# Patient Record
Sex: FEMALE | Race: WHITE | NOT HISPANIC OR LATINO | Employment: UNEMPLOYED | URBAN - METROPOLITAN AREA
[De-identification: names, ages, dates, MRNs, and addresses within clinical notes are randomized per-mention and may not be internally consistent; named-entity substitution may affect disease eponyms.]

---

## 2019-09-25 ENCOUNTER — HOSPITAL ENCOUNTER (EMERGENCY)
Facility: CLINIC | Age: 59
Discharge: HOME | End: 2019-09-25
Admitting: PHYSICIAN ASSISTANT
Payer: COMMERCIAL

## 2019-09-25 VITALS
WEIGHT: 140 LBS | RESPIRATION RATE: 16 BRPM | HEART RATE: 88 BPM | BODY MASS INDEX: 27.34 KG/M2 | OXYGEN SATURATION: 99 % | TEMPERATURE: 98.4 F | SYSTOLIC BLOOD PRESSURE: 198 MMHG | DIASTOLIC BLOOD PRESSURE: 108 MMHG

## 2019-09-25 DIAGNOSIS — R03.0 ELEVATED BLOOD PRESSURE READING WITHOUT DIAGNOSIS OF HYPERTENSION: ICD-10-CM

## 2019-09-25 DIAGNOSIS — N12 PYELONEPHRITIS: ICD-10-CM

## 2019-09-25 LAB
ALBUMIN UR-MCNC: 100 MG/DL
ANION GAP SERPL CALCULATED.3IONS-SCNC: 5 MMOL/L (ref 3–14)
APPEARANCE UR: ABNORMAL
BACTERIA #/AREA URNS HPF: ABNORMAL /HPF
BASOPHILS # BLD AUTO: 0 10E9/L (ref 0–0.2)
BASOPHILS NFR BLD AUTO: 0.2 %
BILIRUB UR QL STRIP: NEGATIVE
BUN SERPL-MCNC: 12 MG/DL (ref 7–30)
CALCIUM SERPL-MCNC: 9 MG/DL (ref 8.5–10.1)
CHLORIDE SERPL-SCNC: 107 MMOL/L (ref 94–109)
CO2 SERPL-SCNC: 27 MMOL/L (ref 20–32)
COLOR UR AUTO: ABNORMAL
CREAT SERPL-MCNC: 0.72 MG/DL (ref 0.52–1.04)
DIFFERENTIAL METHOD BLD: ABNORMAL
EOSINOPHIL # BLD AUTO: 0 10E9/L (ref 0–0.7)
EOSINOPHIL NFR BLD AUTO: 0.3 %
ERYTHROCYTE [DISTWIDTH] IN BLOOD BY AUTOMATED COUNT: 12.6 % (ref 10–15)
GFR SERPL CREATININE-BSD FRML MDRD: >90 ML/MIN/{1.73_M2}
GLUCOSE SERPL-MCNC: 107 MG/DL (ref 70–99)
GLUCOSE UR STRIP-MCNC: NEGATIVE MG/DL
HCT VFR BLD AUTO: 42.1 % (ref 35–47)
HGB BLD-MCNC: 14.4 G/DL (ref 11.7–15.7)
HGB UR QL STRIP: ABNORMAL
IMM GRANULOCYTES # BLD: 0.1 10E9/L (ref 0–0.4)
IMM GRANULOCYTES NFR BLD: 0.4 %
KETONES UR STRIP-MCNC: NEGATIVE MG/DL
LEUKOCYTE ESTERASE UR QL STRIP: ABNORMAL
LYMPHOCYTES # BLD AUTO: 1.6 10E9/L (ref 0.8–5.3)
LYMPHOCYTES NFR BLD AUTO: 13.2 %
MCH RBC QN AUTO: 30.1 PG (ref 26.5–33)
MCHC RBC AUTO-ENTMCNC: 34.2 G/DL (ref 31.5–36.5)
MCV RBC AUTO: 88 FL (ref 78–100)
MONOCYTES # BLD AUTO: 0.8 10E9/L (ref 0–1.3)
MONOCYTES NFR BLD AUTO: 6.4 %
MUCOUS THREADS #/AREA URNS LPF: PRESENT /LPF
NEUTROPHILS # BLD AUTO: 9.7 10E9/L (ref 1.6–8.3)
NEUTROPHILS NFR BLD AUTO: 79.5 %
NITRATE UR QL: NEGATIVE
NRBC # BLD AUTO: 0 10*3/UL
NRBC BLD AUTO-RTO: 0 /100
PH UR STRIP: 6.5 PH (ref 5–7)
PLATELET # BLD AUTO: 304 10E9/L (ref 150–450)
POTASSIUM SERPL-SCNC: 3.4 MMOL/L (ref 3.4–5.3)
RBC # BLD AUTO: 4.78 10E12/L (ref 3.8–5.2)
RBC #/AREA URNS AUTO: >182 /HPF (ref 0–2)
SODIUM SERPL-SCNC: 139 MMOL/L (ref 133–144)
SOURCE: ABNORMAL
SP GR UR STRIP: 1.01 (ref 1–1.03)
SQUAMOUS #/AREA URNS AUTO: 1 /HPF (ref 0–1)
UROBILINOGEN UR STRIP-MCNC: NORMAL MG/DL (ref 0–2)
WBC # BLD AUTO: 12.2 10E9/L (ref 4–11)
WBC #/AREA URNS AUTO: >182 /HPF (ref 0–5)
WBC CLUMPS #/AREA URNS HPF: PRESENT /HPF

## 2019-09-25 PROCEDURE — 25000128 H RX IP 250 OP 636: Performed by: PHYSICIAN ASSISTANT

## 2019-09-25 PROCEDURE — 85025 COMPLETE CBC W/AUTO DIFF WBC: CPT | Performed by: PHYSICIAN ASSISTANT

## 2019-09-25 PROCEDURE — 80048 BASIC METABOLIC PNL TOTAL CA: CPT | Performed by: PHYSICIAN ASSISTANT

## 2019-09-25 PROCEDURE — 81001 URINALYSIS AUTO W/SCOPE: CPT | Performed by: EMERGENCY MEDICINE

## 2019-09-25 PROCEDURE — 87086 URINE CULTURE/COLONY COUNT: CPT | Performed by: PHYSICIAN ASSISTANT

## 2019-09-25 PROCEDURE — 99284 EMERGENCY DEPT VISIT MOD MDM: CPT | Mod: 25

## 2019-09-25 PROCEDURE — 96365 THER/PROPH/DIAG IV INF INIT: CPT

## 2019-09-25 RX ORDER — CIPROFLOXACIN 500 MG/1
500 TABLET, FILM COATED ORAL 2 TIMES DAILY
Qty: 14 TABLET | Refills: 0 | Status: SHIPPED | OUTPATIENT
Start: 2019-09-25 | End: 2019-10-02

## 2019-09-25 RX ORDER — ONDANSETRON 4 MG/1
4 TABLET, ORALLY DISINTEGRATING ORAL EVERY 6 HOURS PRN
Qty: 20 TABLET | Refills: 0 | Status: SHIPPED | OUTPATIENT
Start: 2019-09-25 | End: 2019-10-13

## 2019-09-25 RX ORDER — CEFTRIAXONE 1 G/1
1 INJECTION, POWDER, FOR SOLUTION INTRAMUSCULAR; INTRAVENOUS ONCE
Status: COMPLETED | OUTPATIENT
Start: 2019-09-25 | End: 2019-09-25

## 2019-09-25 RX ADMIN — CEFTRIAXONE SODIUM 1 G: 1 INJECTION, POWDER, FOR SOLUTION INTRAMUSCULAR; INTRAVENOUS at 22:04

## 2019-09-25 ASSESSMENT — ENCOUNTER SYMPTOMS
VOMITING: 0
FEVER: 0
FREQUENCY: 1
BACK PAIN: 1
DYSURIA: 1

## 2019-09-25 NOTE — ED AVS SNAPSHOT
[facility]  [address]  [facility] [address]  Phone:  [phone]  Fax:  [phone]                                    Carmen Whiteside   MRN: [id number]    Department:  [facility] Emergency Department   Date of Visit:  9/25/2019           After Visit Summary Signature Page    I have received my discharge instructions, and my questions have been answered. I have discussed any challenges I see with this plan with the nurse or doctor.    ..........................................................................................................................................  Patient/Patient Representative Signature      ..........................................................................................................................................  Patient Representative Print Name and Relationship to Patient    ..................................................               ................................................  Date                                   Time    ..........................................................................................................................................  Reviewed by Signature/Title    ...................................................              ..............................................  Date                                               Time          22EPIC Rev 08/18

## 2019-09-26 NOTE — DISCHARGE INSTRUCTIONS
Discharge Instructions  Urinary Tract Infection  You or your child have been diagnosed with a urinary tract infection, or UTI. The urinary tract includes the kidneys (which make urine/pee), ureters (the tubes that carry urine/pee from the kidneys to the bladder), the bladder (which stores urine/pee), and urethra (the tube that carries urine/pee out of the bladder). Urinary tract infections occur when bacteria travel up the urethra into the bladder (bladder infection) and, in some cases, from there into the kidneys (kidney infection).  Generally, every Emergency Department visit should have a follow-up clinic visit with either a primary or a specialty clinic/provider. Please follow-up as instructed by your emergency provider today.  Return to the Emergency Department if:  You or your child have severe back pain.  You or your child are vomiting (throwing up) so that you cannot take your medicine.  You or your child have a new fever (had not previously had a fever) over 101 F.  You or your child have confusion or are very weak, or feel very ill.  Your child seems much more ill, will not wake up, will not respond right, or is crying for a long time and will not calm down.  You or your child are showing signs of dehydration. These signs may include decreased urination (pee), dry mouth/gums/tongue, or decreased activity.    Follow-up with your provider:   Children under 24 months need to be seen by their regular provider within one week after a diagnosis of a UTI. It may be necessary to do some more tests to look at the child s kidney or bladder.  You should begin to feel better within 24 - 48 hours of starting your antibiotic; follow-up with your regular clinic/doctor/provider if this is not the case.    Treatment:   You will be treated with an antibiotic to kill the bacteria. We have to make an educated guess, based on what we know about common bacteria and antibiotics, as to which antibiotic will work for your  "infection. We will be correct most times but there will be some cases where the antibiotic chosen is not correct (see urine cultures below).  Take a pain medication such as acetaminophen (Tylenol ) or ibuprofen (Advil , Motrin , Nuprin ).  Phenazopyridine (Pyridium , Uristat ) is a prescription medication that numbs the bladder to reduce the burning pain of some UTIs.  The same medication is available in a non-prescription version (Azo-Standard , Urodol ). This medication will change the color of the urine and tears (usually blue or orange). If you wear contacts, do not wear them while taking this medication as they may be stained by the medication.    Urine Cultures:  If indicated, a urine culture may have been performed today. This test generally takes 24-48 hours to complete so the results are not known at this time. The results can confirm that an infection is present but also determine which antibiotic is effective for the specific bacteria that is causing the infection. If your urine culture shows that the antibiotic you were given today will not work to treat your infection, we will attempt to contact you to make arrangements to change the antibiotic. If the culture confirms that the antibiotic is effective for your infection, you will not be contacted. We often recommend follow-up with your regular physician/provider on the culture results regardless of this process.    Antibiotic Warning:   If you have been placed on antibiotics - watch for signs of allergic reaction.  These include rash, lip swelling, difficulty breathing, wheezing, and dizziness.  If you develop any of these symptoms, stop the antibiotic immediately and go to an emergency room or urgent care for evaluation.    Probiotics: If you have been given an antibiotic, you may want to also take a probiotic pill or eat yogurt with live cultures. Probiotics have \"good bacteria\" to help your intestines stay healthy. Studies have shown that probiotics " help prevent diarrhea and other intestine problems (including C. diff infection) when you take antibiotics. You can buy these without a prescription in the pharmacy section of the store.   If you were given a prescription for medicine here today, be sure to read all of the information (including the package insert) that comes with your prescription.  This will include important information about the medicine, its side effects, and any warnings that you need to know about.  The pharmacist who fills the prescription can provide more information and answer questions you may have about the medicine.  If you have questions or concerns that the pharmacist cannot address, please call or return to the Emergency Department.   Remember that you can always come back to the Emergency Department if you are not able to see your regular provider in the amount of time listed above, if you get any new symptoms, or if there is anything that worries you.    Discharge Instructions  Hypertension - High Blood Pressure    During you visit to the Emergency Department, your blood pressure was higher than the recommended blood pressure.  This may be related to stress, pain, medication or other temporary conditions. In these cases, your blood pressure may return to normal on its own. If you have a history of high blood pressure, you may need to have your provider adjust your medications. Sometimes, your high measurement here may indicate that you have developed high blood pressure that will stay high unless it is treated. As a general rule, high blood pressure causes problems over years rather than days, weeks, or months. So, while it is important to treat blood pressure, it is rarely important to treat blood pressure immediately. Occasionally we will begin a medication in the Emergency Department; more often we will recommend close follow-up for medications with a primary doctor/clinic.    Generally, every Emergency Department visit should  have a follow-up clinic visit with either a primary or a specialty clinic/provider. Please follow-up as instructed by your emergency provider today.    Return to the Emergency Department if you start to have:  A severe headache.  Chest pain.  Shortness of breath.  Weakness or numbness that affects one part of the body.  Confusion.  Vision changes.  Significant swelling of legs and/or eyes.  A reaction to any medication started in the Emergency Department.    What can I do to help myself?  Avoid alcohol.  Take any blood pressure medicine that you are prescribed.  Get a good night s sleep.  Lower your salt intake.  Exercise.  Lose weight.  Manage stress.  See your doctor regularly    If blood pressure medication was started in the Emergency Department:  The medicine may not have an immediate effect. The body and brain determine what blood pressure you have. The medicine s job is to retrain the body s  thermostat  to a lower blood pressure.  You will need to follow up with your provider to see how this medicine is working for you.  If you were given a prescription for medicine here today, be sure to read all of the information (including the package insert) that comes with your prescription.  This will include important information about the medicine, its side effects, and any warnings that you need to know about.  The pharmacist who fills the prescription can provide more information and answer questions you may have about the medicine.  If you have questions or concerns that the pharmacist cannot address, please call or return to the Emergency Department.   Remember that you can always come back to the Emergency Department if you are not able to see your regular provider in the amount of time listed above, if you get any new symptoms, or if there is anything that worries you.

## 2019-09-26 NOTE — ED TRIAGE NOTES
Pt in with C/O burning and frequency with urination. Pt A&Ox4 ABCD's intact. Pt hypertensive on arrival. Pt report she has a hx of HTN in the past and stopped taking her BP medication because it upset her stomach. Pt denies additional sx. Pt A&Ox4 ABCD's intact

## 2019-09-26 NOTE — ED PROVIDER NOTES
"  History     Chief Complaint:  Urinary frequency and burning    HPI   Carmen Whiteside is a 59 year old female with a history of hypertension who presents to the emergency department for evaluation of dysuria and frequency. The patient reports that this morning she began having dysuria. She noticed increased urinary frequency over the past week.  In the emergency department, the patient reports having back pain. The patient states having a UTI a \"long time ago.\" She denies any vomiting, fever, history of diabetes and kidney issues. No chest pain, shortness of breath or headache.    Allergies:  No Known Drug Allergies    Medications:    Imitrex  Norvasc    Past Medical History:    Hypertension  Migraine  GERD  Anxiety    Past Surgical History:        Family History:    Cancer  Arthritis    Social History:  The patient was accompanied to the ED by her .  Smoking Status: Never  Smokeless Tobacco: never  Alcohol Use: Yes  Drug Use: No  Marital Status:        Review of Systems   Constitutional: Negative for fever.   Gastrointestinal: Negative for vomiting.   Genitourinary: Positive for dysuria and frequency.   Musculoskeletal: Positive for back pain.   All other systems reviewed and are negative.    Physical Exam   Vitals:  Patient Vitals for the past 24 hrs:   BP Temp Temp src Pulse Resp SpO2 Weight   19 2300 (!) 198/108 -- -- 88 -- -- --   19 2245 (!) 182/112 -- -- 89 -- -- --   19 2230 (!) 185/114 -- -- 83 -- -- --   19 2215 (!) 187/106 -- -- 84 -- -- --   19 2018 (!) 193/122 98.4  F (36.9  C) Oral 107 16 99 % 63.5 kg (140 lb)     Physical Exam  General: Alert and cooperative with exam. Resting comfortably on gurney  Head:  Scalp is NC/AT  Eyes:  No scleral icterus, PERRL  ENT:  The external nose and ears are normal.   Neck:  Normal range of motion without rigidity.  CV:  Regular rate and rhythm    No pathologic murmur, rubs, or gallops.  Resp:  Breath sounds are " clear bilaterally.  No crackles, wheezes, rhonchi.    Non-labored, no retractions or accessory muscle use  GI:  Abdomen is soft, no distension, no tenderness, no masses. No peritoneal signs.  Bowel sounds present in all quadrants  :  No suprapubic tenderness. Bilateral flank tenderness.  MS:  No lower extremity edema or asymmetric calf swelling.  Skin:  Warm and dry, No rash or lesions noted.  Neuro: Oriented. No gross motor deficits.  Psych: Awake. Alert. Normal affect. Appropriate interactions.      Emergency Department Course     Laboratory:  UA with micro: Blood large Protein Albumin 100 Leukocyte Esterase large WBC >182 (H) RBC >182 (H) WBC Clumps present Bacteria moderate Mucous present o/w negative    CBC: WBC 12.2 (H) o/w WNL. (HGB 14.4, )   BMP: Glucose 107 (H) o/w AWNL (Creatinine 0.72)    Urine Culture: Pending    Interventions:  2249 Rocephin 1 g IV    Emergency Department Course:  Nursing notes and vitals reviewed. 2137 I performed an exam of the patient as documented above.     IV inserted. Medicine administered as documented above. The patient provided a urine sample here in the emergency department. This was sent for laboratory testing, findings above.     Blood drawn. This was sent to the lab for further testing, results above.    2344 I rechecked the patient and discussed the results of her workup thus far.     Findings and plan explained to the Patient. Patient discharged home with instructions regarding supportive care, medications, and reasons to return. The importance of close follow-up was reviewed. The patient was prescribed Cipro and Zofran    I personally reviewed the laboratory results with the Patient and answered all related questions prior to discharge.    Impression & Plan      Medical Decision Making:   Carmen Whiteside is a 59 year old female who presents for evaluation of dysuria, urinary frequency and flank pain.  Urinalysis is consistent with a urinary tract infection;  given the systemic symptoms present, signs and symptoms consistent with pyelonephritis.   There is no clinical evidence of perinephric abscess, emphysematous pyelonephritis, ureterolithiasis, appendicitis, colitis, diverticulitis or any intraabdominal catastrophe.  Patient was given dose of antibiotics in ED; see above.     Given well appearance, I believe the risk of imminent deterioration is low enough that outpatient management is indicated.  Return if increasing pain, vomiting, fever, or inability to tolerate the oral antibiotic.  Follow up with primary physician is indicated in 1 days.  We also discussed markedly elevated BP and importance of close follow-up.  We have decided together against starting medications today.  No evidence of HTN emergency.  Kidney function normal.    Diagnosis:    ICD-10-CM    1. Pyelonephritis N12    2. Elevated blood pressure reading without diagnosis of hypertension R03.0        Disposition:  discharged to home    Discharge Medications:  New Prescriptions    CIPROFLOXACIN (CIPRO) 500 MG TABLET    Take 1 tablet (500 mg) by mouth 2 times daily for 7 days    ONDANSETRON (ZOFRAN ODT) 4 MG ODT TAB    Take 1 tablet (4 mg) by mouth every 6 hours as needed for nausea or vomiting     ILuis Enrique, am serving as a scribe on 9/25/2019 at 9:39 PM to personally document services performed by Norris Judge PA-C based on my observations and the provider's statements to me.     Luis Enrique Perez  9/25/2019   [facility] EMERGENCY DEPARTMENT       Norris Judge PA-C  09/26/19 8725

## 2019-09-27 LAB
BACTERIA SPEC CULT: NORMAL
Lab: NORMAL
SPECIMEN SOURCE: NORMAL

## 2019-09-27 NOTE — RESULT ENCOUNTER NOTE
Final urine culture report is NEGATIVE per [facility] ED Lab Result protocol.    If NEGATIVE result, no change in treatment, per [facility] ED Lab Result protocol.

## 2019-10-13 ENCOUNTER — HOSPITAL ENCOUNTER (EMERGENCY)
Facility: CLINIC | Age: 59
Discharge: HOME | End: 2019-10-13
Attending: EMERGENCY MEDICINE | Admitting: EMERGENCY MEDICINE
Payer: COMMERCIAL

## 2019-10-13 ENCOUNTER — APPOINTMENT (OUTPATIENT)
Dept: CT IMAGING | Facility: CLINIC | Age: 59
End: 2019-10-13
Attending: EMERGENCY MEDICINE
Payer: COMMERCIAL

## 2019-10-13 VITALS
DIASTOLIC BLOOD PRESSURE: 101 MMHG | OXYGEN SATURATION: 98 % | SYSTOLIC BLOOD PRESSURE: 172 MMHG | RESPIRATION RATE: 14 BRPM | TEMPERATURE: 98.2 F | HEART RATE: 85 BPM

## 2019-10-13 DIAGNOSIS — N39.0 COMPLICATED URINARY TRACT INFECTION: ICD-10-CM

## 2019-10-13 LAB
ALBUMIN SERPL-MCNC: 3.6 G/DL (ref 3.4–5)
ALBUMIN UR-MCNC: 70 MG/DL
ALP SERPL-CCNC: 67 U/L (ref 40–150)
ALT SERPL W P-5'-P-CCNC: 23 U/L (ref 0–50)
ANION GAP SERPL CALCULATED.3IONS-SCNC: 9 MMOL/L (ref 3–14)
APPEARANCE UR: ABNORMAL
AST SERPL W P-5'-P-CCNC: 19 U/L (ref 0–45)
BASOPHILS # BLD AUTO: 0 10E9/L (ref 0–0.2)
BASOPHILS NFR BLD AUTO: 0.4 %
BILIRUB SERPL-MCNC: 0.4 MG/DL (ref 0.2–1.3)
BILIRUB UR QL STRIP: NEGATIVE
BUN SERPL-MCNC: 11 MG/DL (ref 7–30)
CALCIUM SERPL-MCNC: 8.9 MG/DL (ref 8.5–10.1)
CHLORIDE SERPL-SCNC: 107 MMOL/L (ref 94–109)
CO2 SERPL-SCNC: 22 MMOL/L (ref 20–32)
COLOR UR AUTO: ABNORMAL
CREAT SERPL-MCNC: 0.71 MG/DL (ref 0.52–1.04)
DIFFERENTIAL METHOD BLD: ABNORMAL
EOSINOPHIL # BLD AUTO: 0.1 10E9/L (ref 0–0.7)
EOSINOPHIL NFR BLD AUTO: 0.6 %
ERYTHROCYTE [DISTWIDTH] IN BLOOD BY AUTOMATED COUNT: 12.4 % (ref 10–15)
GFR SERPL CREATININE-BSD FRML MDRD: >90 ML/MIN/{1.73_M2}
GLUCOSE SERPL-MCNC: 87 MG/DL (ref 70–99)
GLUCOSE UR STRIP-MCNC: NEGATIVE MG/DL
HCT VFR BLD AUTO: 40.8 % (ref 35–47)
HGB BLD-MCNC: 13.5 G/DL (ref 11.7–15.7)
HGB UR QL STRIP: ABNORMAL
IMM GRANULOCYTES # BLD: 0 10E9/L (ref 0–0.4)
IMM GRANULOCYTES NFR BLD: 0.4 %
KETONES UR STRIP-MCNC: NEGATIVE MG/DL
LEUKOCYTE ESTERASE UR QL STRIP: ABNORMAL
LIPASE SERPL-CCNC: 112 U/L (ref 73–393)
LYMPHOCYTES # BLD AUTO: 2.2 10E9/L (ref 0.8–5.3)
LYMPHOCYTES NFR BLD AUTO: 19.2 %
MCH RBC QN AUTO: 30.1 PG (ref 26.5–33)
MCHC RBC AUTO-ENTMCNC: 33.1 G/DL (ref 31.5–36.5)
MCV RBC AUTO: 91 FL (ref 78–100)
MONOCYTES # BLD AUTO: 0.8 10E9/L (ref 0–1.3)
MONOCYTES NFR BLD AUTO: 7.2 %
MUCOUS THREADS #/AREA URNS LPF: PRESENT /LPF
NEUTROPHILS # BLD AUTO: 8.2 10E9/L (ref 1.6–8.3)
NEUTROPHILS NFR BLD AUTO: 72.2 %
NITRATE UR QL: NEGATIVE
NRBC # BLD AUTO: 0 10*3/UL
NRBC BLD AUTO-RTO: 0 /100
PH UR STRIP: 7 PH (ref 5–7)
PLATELET # BLD AUTO: 286 10E9/L (ref 150–450)
POTASSIUM SERPL-SCNC: 3.8 MMOL/L (ref 3.4–5.3)
PROT SERPL-MCNC: 7.2 G/DL (ref 6.8–8.8)
RBC # BLD AUTO: 4.48 10E12/L (ref 3.8–5.2)
RBC #/AREA URNS AUTO: >182 /HPF (ref 0–2)
SODIUM SERPL-SCNC: 138 MMOL/L (ref 133–144)
SOURCE: ABNORMAL
SP GR UR STRIP: 1.01 (ref 1–1.03)
SQUAMOUS #/AREA URNS AUTO: 1 /HPF (ref 0–1)
UROBILINOGEN UR STRIP-MCNC: NORMAL MG/DL (ref 0–2)
WBC # BLD AUTO: 11.3 10E9/L (ref 4–11)
WBC #/AREA URNS AUTO: >182 /HPF (ref 0–5)
WBC CLUMPS #/AREA URNS HPF: PRESENT /HPF

## 2019-10-13 PROCEDURE — 96365 THER/PROPH/DIAG IV INF INIT: CPT

## 2019-10-13 PROCEDURE — 99285 EMERGENCY DEPT VISIT HI MDM: CPT | Mod: 25

## 2019-10-13 PROCEDURE — 25000132 ZZH RX MED GY IP 250 OP 250 PS 637: Performed by: EMERGENCY MEDICINE

## 2019-10-13 PROCEDURE — 87186 SC STD MICRODIL/AGAR DIL: CPT | Performed by: EMERGENCY MEDICINE

## 2019-10-13 PROCEDURE — 87086 URINE CULTURE/COLONY COUNT: CPT | Performed by: EMERGENCY MEDICINE

## 2019-10-13 PROCEDURE — 96375 TX/PRO/DX INJ NEW DRUG ADDON: CPT

## 2019-10-13 PROCEDURE — 85025 COMPLETE CBC W/AUTO DIFF WBC: CPT | Performed by: EMERGENCY MEDICINE

## 2019-10-13 PROCEDURE — 74176 CT ABD & PELVIS W/O CONTRAST: CPT

## 2019-10-13 PROCEDURE — 87088 URINE BACTERIA CULTURE: CPT | Performed by: EMERGENCY MEDICINE

## 2019-10-13 PROCEDURE — 83690 ASSAY OF LIPASE: CPT | Performed by: EMERGENCY MEDICINE

## 2019-10-13 PROCEDURE — 25000128 H RX IP 250 OP 636: Performed by: EMERGENCY MEDICINE

## 2019-10-13 PROCEDURE — 81001 URINALYSIS AUTO W/SCOPE: CPT | Performed by: EMERGENCY MEDICINE

## 2019-10-13 PROCEDURE — 36415 COLL VENOUS BLD VENIPUNCTURE: CPT | Performed by: EMERGENCY MEDICINE

## 2019-10-13 PROCEDURE — 80053 COMPREHEN METABOLIC PANEL: CPT | Performed by: EMERGENCY MEDICINE

## 2019-10-13 RX ORDER — LISINOPRIL 10 MG/1
10 TABLET ORAL ONCE
Status: COMPLETED | OUTPATIENT
Start: 2019-10-13 | End: 2019-10-13

## 2019-10-13 RX ORDER — CEFTRIAXONE 1 G/1
1 INJECTION, POWDER, FOR SOLUTION INTRAMUSCULAR; INTRAVENOUS ONCE
Status: COMPLETED | OUTPATIENT
Start: 2019-10-13 | End: 2019-10-13

## 2019-10-13 RX ORDER — CEPHALEXIN 500 MG/1
500 CAPSULE ORAL 4 TIMES DAILY
Qty: 28 CAPSULE | Refills: 0 | Status: SHIPPED | OUTPATIENT
Start: 2019-10-13 | End: 2019-10-20

## 2019-10-13 RX ORDER — ONDANSETRON 4 MG/1
4 TABLET, ORALLY DISINTEGRATING ORAL EVERY 8 HOURS PRN
Qty: 10 TABLET | Refills: 0 | Status: SHIPPED | OUTPATIENT
Start: 2019-10-13 | End: 2019-10-16

## 2019-10-13 RX ORDER — PHENAZOPYRIDINE HYDROCHLORIDE 200 MG/1
200 TABLET, FILM COATED ORAL 3 TIMES DAILY
Qty: 9 TABLET | Refills: 0 | Status: SHIPPED | OUTPATIENT
Start: 2019-10-13 | End: 2019-10-16

## 2019-10-13 RX ORDER — ONDANSETRON 2 MG/ML
4 INJECTION INTRAMUSCULAR; INTRAVENOUS ONCE
Status: COMPLETED | OUTPATIENT
Start: 2019-10-13 | End: 2019-10-13

## 2019-10-13 RX ADMIN — CEFTRIAXONE SODIUM 1 G: 1 INJECTION, POWDER, FOR SOLUTION INTRAMUSCULAR; INTRAVENOUS at 21:06

## 2019-10-13 RX ADMIN — LISINOPRIL 10 MG: 10 TABLET ORAL at 19:46

## 2019-10-13 RX ADMIN — ONDANSETRON HYDROCHLORIDE 4 MG: 2 INJECTION, SOLUTION INTRAMUSCULAR; INTRAVENOUS at 22:02

## 2019-10-13 ASSESSMENT — ENCOUNTER SYMPTOMS
DIARRHEA: 1
FLANK PAIN: 1
VOMITING: 1
FEVER: 0
FREQUENCY: 1
ABDOMINAL PAIN: 1
NAUSEA: 0
BACK PAIN: 1
DYSURIA: 1

## 2019-10-13 NOTE — ED AVS SNAPSHOT
[facility] Emergency Department  [address]  [facility] [address]  Phone:  [phone]  Fax:  [phone]                                    Carmen Whiteside   MRN: [id number]    Department:  [facility] Emergency Department   Date of Visit:  10/13/2019           After Visit Summary Signature Page    I have received my discharge instructions, and my questions have been answered. I have discussed any challenges I see with this plan with the nurse or doctor.    ..........................................................................................................................................  Patient/Patient Representative Signature      ..........................................................................................................................................  Patient Representative Print Name and Relationship to Patient    ..................................................               ................................................  Date                                   Time    ..........................................................................................................................................  Reviewed by Signature/Title    ...................................................              ..............................................  Date                                               Time          22EPIC Rev 08/18

## 2019-10-13 NOTE — ED TRIAGE NOTES
Patient comes in for evaluation of urinary urgency and frequency, painful urination, pain in lower back. ABCs intact.

## 2019-10-13 NOTE — ED PROVIDER NOTES
History     Chief Complaint:  Rule out Urinary Tract Infection and Flank Pain    HPI   Carmen Whiteside is a 59 year old female with a history of hypertension who presents to the emergency department today for evaluation of urinary symptoms and flank pain. Patient feels like she has to urinate all the time, and when she does urinate, it burns. She was seen here 2 weeks ago, and was told she had a kidney infection. She was treated with Cipro after that visit. Symptoms started up again a few days ago. She did vomit a few days ago, but has not had issues today. She also has some lower abdominal pain bilaterally. She notes this feels similar to UTI's she has had in the past. She does not get frequent UTI's. She denies nausea, fever, or other issues. She normally sees a doctor through [facility] in [place]. Besides blood pressure, patient denies other medical problems. She recently had her blood pressure changed in September due to intolerance to the prior medication. She missed today's dose and does not know if you blood pressure is adequately controlled with new medication. She took one Azithromycin pill earlier today, that was left over from her . Additionally, patient had some diarrhea, but she is not sure if this is related to her urinary issue. Her appetite has been normal, per patient.     Allergies:  No known drug allergies     Medications:    Lisinopril  Ibuprofen   Zofran    Past Medical History:    Hypertension     Past Surgical History:    GYN surgery    Family History:    History reviewed. No pertinent family history.      Social History:  The patient was accompanied to the ED by her .  Smoking Status: no  Smokeless Tobacco: no  Alcohol Use: yes   Marital Status:   [2]     Review of Systems   Constitutional: Negative for fever.   Gastrointestinal: Positive for abdominal pain, diarrhea and vomiting. Negative for nausea.   Genitourinary: Positive for dysuria, flank pain and  frequency.   Musculoskeletal: Positive for back pain.   All other systems reviewed and are negative.        Physical Exam     Patient Vitals for the past 24 hrs:   BP Temp Temp src Pulse Resp SpO2   10/13/19 2045 (!) 173/102 -- -- 81 -- 96 %   10/13/19 2030 (!) 173/98 -- -- 83 -- (!) 89 %   10/13/19 2015 (!) 180/111 -- -- 85 -- 98 %   10/13/19 2000 (!) 170/107 -- -- -- -- 100 %   10/13/19 1945 (!) 174/101 -- -- 79 -- 98 %   10/13/19 1839 (!) 209/124 -- -- -- -- --   10/13/19 1838 -- 98.2  F (36.8  C) Oral 96 14 99 %        Physical Exam    Nursing note and vitals reviewed.    Constitutional: Pleasant and well groomed. Well appearing.          HENT:    Mouth/Throat: Oropharynx is without swelling or erythema. Oral mucosa moist.    Eyes: Conjunctivae are normal. No scleral icterus.    Neck: Neck supple.   Cardiovascular: Normal rate, regular rhythm and intact distal pulses.    Pulmonary/Chest: Effort normal and breath sounds normal.   Abdominal: Soft.  No distension. Bilateral and mid epigastrium tenderness without guarding. . Reports lower abdominal tenderness, but was not as tender there upon palpation.   Musculoskeletal:  No edema, No calf tenderness.   Genitourinary: CVA tenderness bilaterally.   Neurological:Alert. Coordination normal.   Skin: Skin is warm and dry.   Psychiatric: Normal mood and affect.         Emergency Department Course   Laboratory:  Laboratory findings were communicated with the patient who voiced understanding of the findings.   CBC: WBC 11.3 (H) , HGB 13.5,    UA RESULTS:  Recent Labs   Lab Test 10/13/19  1936   COLOR Light Yellow   APPEARANCE Slightly Cloudy   URINEGLC Negative   URINEBILI Negative   URINEKETONE Negative   SG 1.007   UBLD Moderate*   URINEPH 7.0   PROTEIN 70*   NITRITE Negative   LEUKEST Large*   RBCU >182*   WBCU >182*      CMP: (Creatinine 0.71)   Lipase: 112    Imaging:  CT abdomen pelvis  IMPRESSION:   1.  Mild left hydronephrosis and hydroureter with associated  inflammatory change but no obstructing stone. There is also circumferential bladder wall thickening and surrounding inflammatory change but no bladder calcification. Findings may be secondary   to infectious process.  Reading per radiology    Interventions:  1946 Lisinopril 10 mg PO  2100 Ceftriaxone 1 gm IV    Emergency Department Course:  Past medical records, nursing notes, and vitals reviewed.    1855: I performed an exam of the patient and obtained history, as documented above.     IV inserted and blood drawn.     The patient provided a urine sample here in the emergency department. This was sent for laboratory testing, findings above.     Explained indication for CT and plan for initiation of antibiotic.     2152: Post void-residual was completed, and no urine was being retained in the bladder.     2229: I rechecked the patient. Findings and plan explained to the Patient. Patient discharged home with instructions regarding supportive care, medications, and reasons to return. The importance of close follow-up was reviewed.          Impression & Plan      Medical Decision Making:  Carmen Whiteside is a 59 year old female presents with urinary tract infection type symptoms as described above in the setting of recently completing a course for similar symptoms. Intrestingly, she had abnormal appearing urine at the type with hematuria, WBC elevation, but the culture was negative. Urine today was also abnormal. Due to additional abdominal discomfort, labs were sent and CT imaging was obtained. CT scan revealed some mild left sided hydronephrosis and ureteral nflammation which radiologist said could be consistent with UTI, but no stone. Due to this finding and her symptoms being consistent with a UTI, I initiated antibiotics.UC is pending.  Patient understands there is some uncertainty in the diagnosis and she should return to the ED with new or worsening symptoms. Follow up in two days if not improving as  anticipated. Patient should otherwise follow up after course of antibiotics for repeat urine analysis. Lastly I have recommended that she monitor her blood pressure and follow up for repeat assessment, possibly sooner than originally planned if it continues to remain elevated.     Diagnosis:    ICD-10-CM    1. Complicated urinary tract infection N39.0        Disposition:  discharged to home    Discharge Medications:  New Prescriptions    CEPHALEXIN (KEFLEX) 500 MG CAPSULE    Take 1 capsule (500 mg) by mouth 4 times daily for 7 days    ONDANSETRON (ZOFRAN ODT) 4 MG ODT TAB    Take 1 tablet (4 mg) by mouth every 8 hours as needed for nausea       Roxana Hood  10/13/2019   [facility] EMERGENCY DEPARTMENT  Scribe Disclosure:  I, Roxana Hood, am serving as a scribe at 6:55 PM on 10/13/2019 to document services personally performed by Renee Adams MD based on my observations and the provider's statements to me.       Renee Adams MD  10/14/19 8411

## 2019-10-14 NOTE — RESULT ENCOUNTER NOTE
Emergency Dept/Urgent Care discharge antibiotic (if prescribed): Cephalexin (Keflex) 500 mg capsule, 1 capsule (500 mg) by mouth 4 times daily for 7 days.  Date of Rx (if applicable):  10/13/19  No changes in treatment per Urine culture protocol.

## 2019-10-16 ENCOUNTER — TELEPHONE (OUTPATIENT)
Dept: EMERGENCY MEDICINE | Facility: CLINIC | Age: 59
End: 2019-10-16

## 2019-10-16 LAB
BACTERIA SPEC CULT: ABNORMAL
Lab: ABNORMAL
SPECIMEN SOURCE: ABNORMAL

## 2019-10-16 NOTE — RESULT ENCOUNTER NOTE
Final Urine Culture Report on 10/16/19  Emergency Dept/Urgent Care discharge antibiotic prescribed: Cephalexin (Keflex) 500 mg capsule, 1 capsule (500 mg) by mouth 4 times daily for 7 days.  #1. Bacteria, 50,000 - 100,000 colonies/mL Staphylococcus Saprophyticus, is SUSCEPTIBLE to Antibiotic.    As per [place] ED Lab Result protocol, no change in antibiotic therapy.

## 2019-10-16 NOTE — TELEPHONE ENCOUNTER
[facility] Emergency Department/Urgent Care Lab result notification [Positive for uti and bacteria is susceptible to antibiotic]:    Reason for call:   Notify of Final urine culture result, confirm patient is taking antibiotic, assess symptoms, and advise per Emergency Dept/Urgent Care discharge instructions and Emergency Dept urine culture protocol.    Lab Result & Date of Final Report [copied from Result Note]:    Final Urine Culture Report on 10/16/19  Emergency Dept/Urgent Care discharge antibiotic prescribed: Cephalexin (Keflex) 500 mg capsule, 1 capsule (500 mg) by mouth 4 times daily for 7 days.  #1. Bacteria, 50,000 - 100,000 colonies/mL Staphylococcus Saprophyticus, is SUSCEPTIBLE to Antibiotic.    As per [place] ED Lab Result protocol, no change in antibiotic therapy.        Left voicemail message requesting a call back to [phone] between 10 a.m. and 6:30 p.m. for patient's ED/UC lab results.     Olamide Lira RN    [email or web address] [place]   Lung Nodule and ED Lab Results F/U RN  Epic pool (ED late result f/u RN) : [id number]   # [phone]

## 2024-09-14 ENCOUNTER — HOSPITAL ENCOUNTER (EMERGENCY)
Facility: CLINIC | Age: 64
Discharge: HOME | End: 2024-09-14
Attending: EMERGENCY MEDICINE | Admitting: EMERGENCY MEDICINE
Payer: COMMERCIAL

## 2024-09-14 ENCOUNTER — APPOINTMENT (OUTPATIENT)
Dept: CT IMAGING | Facility: CLINIC | Age: 64
End: 2024-09-14
Attending: EMERGENCY MEDICINE
Payer: COMMERCIAL

## 2024-09-14 VITALS
BODY MASS INDEX: 28.74 KG/M2 | HEIGHT: 60 IN | RESPIRATION RATE: 18 BRPM | OXYGEN SATURATION: 96 % | WEIGHT: 146.39 LBS | TEMPERATURE: 97.4 F | DIASTOLIC BLOOD PRESSURE: 96 MMHG | SYSTOLIC BLOOD PRESSURE: 144 MMHG | HEART RATE: 64 BPM

## 2024-09-14 DIAGNOSIS — R51.9 ACUTE NONINTRACTABLE HEADACHE, UNSPECIFIED HEADACHE TYPE: ICD-10-CM

## 2024-09-14 DIAGNOSIS — S86.812A STRAIN OF CALF MUSCLE, LEFT, INITIAL ENCOUNTER: ICD-10-CM

## 2024-09-14 DIAGNOSIS — I10 HYPERTENSION, UNSPECIFIED TYPE: ICD-10-CM

## 2024-09-14 DIAGNOSIS — M54.9 MUSCULOSKELETAL BACK PAIN: ICD-10-CM

## 2024-09-14 DIAGNOSIS — M79.662 PAIN OF LEFT CALF: ICD-10-CM

## 2024-09-14 LAB
ANION GAP SERPL CALCULATED.3IONS-SCNC: 13 MMOL/L (ref 7–15)
BASOPHILS # BLD AUTO: 0 10E3/UL (ref 0–0.2)
BASOPHILS NFR BLD AUTO: 1 %
BUN SERPL-MCNC: 10.6 MG/DL (ref 8–23)
CALCIUM SERPL-MCNC: 9.6 MG/DL (ref 8.8–10.4)
CHLORIDE SERPL-SCNC: 104 MMOL/L (ref 98–107)
CREAT SERPL-MCNC: 0.73 MG/DL (ref 0.51–0.95)
CRP SERPL-MCNC: <3 MG/L
EGFRCR SERPLBLD CKD-EPI 2021: >90 ML/MIN/1.73M2
EOSINOPHIL # BLD AUTO: 0.1 10E3/UL (ref 0–0.7)
EOSINOPHIL NFR BLD AUTO: 1 %
ERYTHROCYTE [DISTWIDTH] IN BLOOD BY AUTOMATED COUNT: 12.8 % (ref 10–15)
GLUCOSE SERPL-MCNC: 93 MG/DL (ref 70–99)
HCO3 SERPL-SCNC: 22 MMOL/L (ref 22–29)
HCT VFR BLD AUTO: 44.6 % (ref 35–47)
HGB BLD-MCNC: 14.6 G/DL (ref 11.7–15.7)
HOLD SPECIMEN: NORMAL
HOLD SPECIMEN: NORMAL
IMM GRANULOCYTES # BLD: 0 10E3/UL
IMM GRANULOCYTES NFR BLD: 0 %
LYMPHOCYTES # BLD AUTO: 1.8 10E3/UL (ref 0.8–5.3)
LYMPHOCYTES NFR BLD AUTO: 29 %
MCH RBC QN AUTO: 29.1 PG (ref 26.5–33)
MCHC RBC AUTO-ENTMCNC: 32.7 G/DL (ref 31.5–36.5)
MCV RBC AUTO: 89 FL (ref 78–100)
MONOCYTES # BLD AUTO: 0.5 10E3/UL (ref 0–1.3)
MONOCYTES NFR BLD AUTO: 9 %
NEUTROPHILS # BLD AUTO: 3.8 10E3/UL (ref 1.6–8.3)
NEUTROPHILS NFR BLD AUTO: 60 %
NRBC # BLD AUTO: 0 10E3/UL
NRBC BLD AUTO-RTO: 0 /100
PLATELET # BLD AUTO: 320 10E3/UL (ref 150–450)
POTASSIUM SERPL-SCNC: 3.6 MMOL/L (ref 3.4–5.3)
RBC # BLD AUTO: 5.01 10E6/UL (ref 3.8–5.2)
SODIUM SERPL-SCNC: 139 MMOL/L (ref 135–145)
WBC # BLD AUTO: 6.3 10E3/UL (ref 4–11)

## 2024-09-14 PROCEDURE — 85025 COMPLETE CBC W/AUTO DIFF WBC: CPT | Performed by: EMERGENCY MEDICINE

## 2024-09-14 PROCEDURE — 93005 ELECTROCARDIOGRAM TRACING: CPT

## 2024-09-14 PROCEDURE — 86140 C-REACTIVE PROTEIN: CPT | Performed by: EMERGENCY MEDICINE

## 2024-09-14 PROCEDURE — 250N000011 HC RX IP 250 OP 636: Performed by: EMERGENCY MEDICINE

## 2024-09-14 PROCEDURE — 96375 TX/PRO/DX INJ NEW DRUG ADDON: CPT

## 2024-09-14 PROCEDURE — 75635 CT ANGIO ABDOMINAL ARTERIES: CPT

## 2024-09-14 PROCEDURE — 96374 THER/PROPH/DIAG INJ IV PUSH: CPT | Mod: 59

## 2024-09-14 PROCEDURE — 80048 BASIC METABOLIC PNL TOTAL CA: CPT | Performed by: EMERGENCY MEDICINE

## 2024-09-14 PROCEDURE — 70450 CT HEAD/BRAIN W/O DYE: CPT

## 2024-09-14 PROCEDURE — 99285 EMERGENCY DEPT VISIT HI MDM: CPT | Mod: 25

## 2024-09-14 PROCEDURE — 36415 COLL VENOUS BLD VENIPUNCTURE: CPT | Performed by: EMERGENCY MEDICINE

## 2024-09-14 PROCEDURE — 250N000013 HC RX MED GY IP 250 OP 250 PS 637: Performed by: EMERGENCY MEDICINE

## 2024-09-14 RX ORDER — ONDANSETRON 4 MG/1
4 TABLET, ORALLY DISINTEGRATING ORAL ONCE
Status: COMPLETED | OUTPATIENT
Start: 2024-09-14 | End: 2024-09-14

## 2024-09-14 RX ORDER — LABETALOL HYDROCHLORIDE 5 MG/ML
20 INJECTION, SOLUTION INTRAVENOUS ONCE
Status: COMPLETED | OUTPATIENT
Start: 2024-09-14 | End: 2024-09-14

## 2024-09-14 RX ORDER — METOCLOPRAMIDE HYDROCHLORIDE 5 MG/ML
10 INJECTION INTRAMUSCULAR; INTRAVENOUS ONCE
Status: COMPLETED | OUTPATIENT
Start: 2024-09-14 | End: 2024-09-14

## 2024-09-14 RX ORDER — DIPHENHYDRAMINE HYDROCHLORIDE 50 MG/ML
25 INJECTION INTRAMUSCULAR; INTRAVENOUS ONCE
Status: COMPLETED | OUTPATIENT
Start: 2024-09-14 | End: 2024-09-14

## 2024-09-14 RX ORDER — KETOROLAC TROMETHAMINE 15 MG/ML
15 INJECTION, SOLUTION INTRAMUSCULAR; INTRAVENOUS ONCE
Status: COMPLETED | OUTPATIENT
Start: 2024-09-14 | End: 2024-09-14

## 2024-09-14 RX ORDER — ACETAMINOPHEN 500 MG
1000 TABLET ORAL ONCE
Status: COMPLETED | OUTPATIENT
Start: 2024-09-14 | End: 2024-09-14

## 2024-09-14 RX ORDER — LISINOPRIL 10 MG/1
10 TABLET ORAL DAILY
Qty: 30 TABLET | Refills: 0 | Status: SHIPPED | OUTPATIENT
Start: 2024-09-14 | End: 2024-09-15

## 2024-09-14 RX ORDER — IOPAMIDOL 755 MG/ML
90 INJECTION, SOLUTION INTRAVASCULAR ONCE
Status: COMPLETED | OUTPATIENT
Start: 2024-09-14 | End: 2024-09-14

## 2024-09-14 RX ORDER — OXYCODONE HYDROCHLORIDE 5 MG/1
5 TABLET ORAL ONCE
Status: COMPLETED | OUTPATIENT
Start: 2024-09-14 | End: 2024-09-14

## 2024-09-14 RX ADMIN — LABETALOL HYDROCHLORIDE 20 MG: 5 INJECTION, SOLUTION INTRAVENOUS at 13:07

## 2024-09-14 RX ADMIN — IOPAMIDOL 90 ML: 755 INJECTION, SOLUTION INTRAVENOUS at 13:42

## 2024-09-14 RX ADMIN — DIPHENHYDRAMINE HYDROCHLORIDE 25 MG: 50 INJECTION, SOLUTION INTRAMUSCULAR; INTRAVENOUS at 16:44

## 2024-09-14 RX ADMIN — ONDANSETRON 4 MG: 4 TABLET, ORALLY DISINTEGRATING ORAL at 13:09

## 2024-09-14 RX ADMIN — ACETAMINOPHEN 1000 MG: 500 TABLET, FILM COATED ORAL at 16:44

## 2024-09-14 RX ADMIN — METOCLOPRAMIDE 10 MG: 5 INJECTION, SOLUTION INTRAMUSCULAR; INTRAVENOUS at 16:47

## 2024-09-14 RX ADMIN — OXYCODONE HYDROCHLORIDE 5 MG: 5 TABLET ORAL at 12:55

## 2024-09-14 RX ADMIN — KETOROLAC TROMETHAMINE 15 MG: 15 INJECTION, SOLUTION INTRAMUSCULAR; INTRAVENOUS at 16:46

## 2024-09-14 RX ADMIN — ACETAMINOPHEN 1000 MG: 500 TABLET ORAL at 12:55

## 2024-09-14 ASSESSMENT — ACTIVITIES OF DAILY LIVING (ADL)
ADLS_ACUITY_SCORE: 35

## 2024-09-14 ASSESSMENT — COLUMBIA-SUICIDE SEVERITY RATING SCALE - C-SSRS
2. HAVE YOU ACTUALLY HAD ANY THOUGHTS OF KILLING YOURSELF IN THE PAST MONTH?: NO
6. HAVE YOU EVER DONE ANYTHING, STARTED TO DO ANYTHING, OR PREPARED TO DO ANYTHING TO END YOUR LIFE?: NO
1. IN THE PAST MONTH, HAVE YOU WISHED YOU WERE DEAD OR WISHED YOU COULD GO TO SLEEP AND NOT WAKE UP?: NO

## 2024-09-14 NOTE — ED TRIAGE NOTES
Presents from MD office for HTN. Was being seen for back pain and Left leg pain and found to be hypertensive. HX: HTN. Stopped taking BP medication about a year and a half ago due to side effects of headache, nausea. Pt reports headache, denies any vision changes. Denies chest pain. Had US LE two weeks ago negative for DVT. A&OX4.

## 2024-09-14 NOTE — DISCHARGE INSTRUCTIONS
-Take acetaminophen 500 to 1000 mg by mouth every 4 to 6 hours as needed for pain or fever.  Do not take more than 4000 mg in 24 hours.  Do not take within 6 hours of another acetaminophen containing medication such as norco (vicodin) or percocet.    Please follow-up with your primary care provider this week for reevaluation.  Please record your blood pressure readings at home and take to follow-up with you.    Please take lisinopril as prescribed.    Please return to the emergency department as needed for new or worsening symptoms including chest pain, shortness of breath, fever greater than 104 beats Fahrenheit, vomiting unable to keep anything down, any other concerning symptoms.        Discharge Instructions  Hypertension - High Blood Pressure    During you visit to the Emergency Department, your blood pressure was higher than the recommended blood pressure.  This may be related to stress, pain, medication or other temporary conditions. In these cases, your blood pressure may return to normal on its own. If you have a history of high blood pressure, you may need to have your provider adjust your medications. Sometimes, your high measurement here may indicate that you have developed high blood pressure that will stay high unless it is treated. As a general rule, high blood pressure causes problems over years rather than days, weeks, or months. So, while it is important to treat blood pressure, it is rarely important to treat blood pressure immediately. Occasionally we will begin a medication in the Emergency Department; more often we will recommend close follow-up for medications with a primary doctor/clinic.    Generally, every Emergency Department visit should have a follow-up clinic visit with either a primary or a specialty clinic/provider. Please follow-up as instructed by your emergency provider today.    Return to the Emergency Department if you start to have:  A severe headache.  Chest pain.  Shortness  of breath.  Weakness or numbness that affects one part of the body.  Confusion.  Vision changes.  Significant swelling of legs and/or eyes.  A reaction to any medication started in the Emergency Department.    What can I do to help myself?  Avoid alcohol.  Take any blood pressure medicine that you are prescribed.  Get a good night s sleep.  Lower your salt intake.  Exercise.  Lose weight.  Manage stress.  See your doctor regularly    If blood pressure medication was started in the Emergency Department:  The medicine may not have an immediate effect. The body and brain determine what blood pressure you have. The medicine s job is to retrain the body s  thermostat  to a lower blood pressure.  You will need to follow up with your provider to see how this medicine is working for you.  If you were given a prescription for medicine here today, be sure to read all of the information (including the package insert) that comes with your prescription.  This will include important information about the medicine, its side effects, and any warnings that you need to know about.  The pharmacist who fills the prescription can provide more information and answer questions you may have about the medicine.  If you have questions or concerns that the pharmacist cannot address, please call or return to the Emergency Department.   Remember that you can always come back to the Emergency Department if you are not able to see your regular provider in the amount of time listed above, if you get any new symptoms, or if there is anything that worries you.    Discharge Instructions  Back Pain  You were seen today for back pain. Back pain can have many causes, but most will get better without surgery or other specific treatment. Sometimes there is a herniated ( slipped ) disc. We do not usually do MRI scans to look for these right away, since most herniated discs will get better on their own with time.  Today, we did not find any evidence that  your back pain was caused by a serious condition. However, sometimes symptoms develop over time and cannot be found during an emergency visit, so it is very important that you follow up with your primary provider.  Generally, every Emergency Department visit should have a follow-up clinic visit with either a primary or a specialty clinic/provider. Please follow-up as instructed by your emergency provider today.    Return to the Emergency Department if:  You develop a fever with your back pain.   You have weakness or change in sensation in one or both legs.  You lose control of your bowels or bladder, or cannot empty your bladder (cannot pee).  Your pain gets much worse.     Follow-up with your provider:  Unless your pain has completely gone away, please make an appointment with your provider within one week. Most of the routine care for back pain is available in a clinic and not the Emergency Department. You may need further management of your back pain, such as more pain medication, imaging such as an X-ray or MRI, or physical therapy.    What can I do to help myself?  Remain Active -- People are often afraid that they will hurt their back further or delay recovery by remaining active, but this is one of the best things you can do for your back. In fact, staying in bed for a long time to rest is not recommended. Studies have shown that people with low back pain recover faster when they remain active. Movement helps to bring blood flow to the muscles and relieve muscle spasms as well as preventing loss of muscle strength.  Heat -- Using a heating pad can help with low back pain during the first few weeks. Do not sleep with a heating pad, as you can be burned.   Pain medications - You may take a pain medication such as Tylenol  (acetaminophen), Advil , Motrin  (ibuprofen) or Aleve  (naproxen).  If you were given a prescription for medicine here today, be sure to read all of the information (including the package  insert) that comes with your prescription.  This will include important information about the medicine, its side effects, and any warnings that you need to know about.  The pharmacist who fills the prescription can provide more information and answer questions you may have about the medicine.  If you have questions or concerns that the pharmacist cannot address, please call or return to the Emergency Department.   Remember that you can always come back to the Emergency Department if you are not able to see your regular provider in the amount of time listed above, if you get any new symptoms, or if there is anything that worries you.

## 2024-09-14 NOTE — ED PROVIDER NOTES
Emergency Department Note      History of Present Illness     Chief Complaint   Hypertension, Back Pain, and Leg Pain    HPI   Carmen Whiteside is a 64 year old female with a history of hypertension and migraine who presents with her spouse for evaluation of hypertension, back pain, headache, and left lower extremity pain. The patient reports that she has had intermittent, sharp mid back pain as if she has a large knot for about one month. States that the pain seems to be triggered with activity and when exacerbated, can be a 10/10 on the severity scale. She does have a headache now. Adds that she also has had left calf tenderness for at least one month that is worse in the morning after she has not walked overnight. Notes that she has associated left ankle edema. States that she was seen in clinic and had an ultrasound of this leg that was negative for DVT. She rates her pain a 6/10 on the severity scale on exam in the emergency department. Of note, she did have a fall one and a half months ago. Reports that around the same time, she sustained a large laceration along the midline of her left anterior thigh from a stray dog and was not medically treated for this. Adds that she has been off of antihypertensive medications for over one year because of unwanted side effects. She denies other trauma or injury. She denies tobacco and alcohol use. She denies chest pain, shortness of breath, abdominal pain, nausea, vomiting, diarrhea, constipation, rash, and extremity weakness.     Independent Historian   None    Review of External Notes   Family medicine office visit for similar symptoms/complaint     Past Medical History     Medical History and Problem List   Hypertension  Migraine   GERD  Anxiety     Medications   Tizanidine     Surgical History    section     Physical Exam     Patient Vitals for the past 24 hrs:   BP Temp Temp src Pulse Resp SpO2 Height Weight   24 1740 (!) 144/96 -- -- 64 -- 96 % --  --   09/14/24 1516 (!) 158/95 -- -- 66 -- 95 % -- --   09/14/24 1515 (!) 158/95 -- -- 66 -- -- -- --   09/14/24 1330 (!) 173/96 -- -- 69 -- 96 % -- --   09/14/24 1253 -- -- -- -- -- 96 % -- --   09/14/24 1252 (!) 198/116 -- -- 80 -- -- -- --   09/14/24 1143 (!) 228/129 97.4  F (36.3  C) Temporal 75 18 97 % 1.524 m (5') 66.4 kg (146 lb 6.2 oz)     Physical Exam  Constitutional: Well developed, nontox appearance  Head: Atraumatic.   Neck:  no stridor  Eyes: no scleral icterus  Cardiovascular: RRR, 2+ bilat radial, DP pulses  Pulmonary/Chest: nml resp effort, Clear BS bilat  Abdominal: ND, soft, NT, no rebound or guarding   Back: No T or L-spine tenderness  : no CVA tenderness bilat  Ext: Warm, well perfused, no edema, tenderness to left calf  Neurological: A&O, symmetric facies, moves ext x4  Skin: Skin is warm and dry.   Psychiatric: Behavior is normal. Thought content normal.   Nursing note and vitals reviewed.    Diagnostics     Lab Results   Labs Ordered and Resulted from Time of ED Arrival to Time of ED Departure   BASIC METABOLIC PANEL - Normal       Result Value    Sodium 139      Potassium 3.6      Chloride 104      Carbon Dioxide (CO2) 22      Anion Gap 13      Urea Nitrogen 10.6      Creatinine 0.73      GFR Estimate >90      Calcium 9.6      Glucose 93     CRP INFLAMMATION - Normal    CRP Inflammation <3.00     CBC WITH PLATELETS AND DIFFERENTIAL    WBC Count 6.3      RBC Count 5.01      Hemoglobin 14.6      Hematocrit 44.6      MCV 89      MCH 29.1      MCHC 32.7      RDW 12.8      Platelet Count 320      % Neutrophils 60      % Lymphocytes 29      % Monocytes 9      % Eosinophils 1      % Basophils 1      % Immature Granulocytes 0      NRBCs per 100 WBC 0      Absolute Neutrophils 3.8      Absolute Lymphocytes 1.8      Absolute Monocytes 0.5      Absolute Eosinophils 0.1      Absolute Basophils 0.0      Absolute Immature Granulocytes 0.0      Absolute NRBCs 0.0         Imaging   CTA Chest Abdomen Pelvis  Runoff w Contrast   Final Result   IMPRESSION:      1.  Nonaneurysmal aorta without dissection.       2.  Patent aortic branch vessels. Mild atherosclerosis.      3.  Widely patent lower extremity runoff to the bilateral mid calves. However, the anterior tibial, posterior tibial and peroneal arteries become diminutive in size and suboptimally opacified beyond the mid calf, and cannot accurately characterize    patency past the mid calf.      4.  No pulmonary embolism identified.      5.  No other acute findings to explain symptoms.      REFERENCE:   Guidelines for Management of Incidental Pulmonary Nodules Detected on CT Images: From the Fleischner Society 2017.    Guidelines apply to incidental nodules in patients who are 35 years or older.   Guidelines do not apply to lung cancer screening, patients with immunosuppression, or patients with known primary cancer.      SINGLE NODULE   Nodule size less than or equal to 6 mm   Low-risk patients: No follow-up needed.   High-risk patients: Optional follow-up at 12 months.      Nodule size 6-8 mm   Low-risk patients: Follow-up CT at 6-12 months, then consider CT at 18-24 months.   High-risk patients: Follow-up CT at 6-12 months, then at 18-24 months if no change.      Nodule size >8 mm   Either low or high-risk patients:   Consider CT, PET/CT, or tissue sampling at 3 months.         CT Head w/o Contrast   Final Result   IMPRESSION:   1.  No acute intracranial process.          EKG   ECG results from 09/14/24   EKG 12 lead     Value    Systolic Blood Pressure     Diastolic Blood Pressure     Ventricular Rate 66    Atrial Rate 66    WV Interval 204    QRS Duration 78        QTc 404    P Axis 62    R AXIS -5    T Axis 36    Interpretation ECG      Sinus rhythm  Minimal voltage criteria for LVH, may be normal variant ( R in aVL )  Borderline ECG  When compared with ECG of 26-Sep-2015 20:55,  No significant change was found    Interpreted by David MENEZES at 1208          Independent Interpretation   EKG significant for normal sinus rhythm without acute ischemic findings     ED Course      Medications Administered   Medications   oxyCODONE (ROXICODONE) tablet 5 mg (5 mg Oral $Given 9/14/24 1255)   acetaminophen (TYLENOL) tablet 1,000 mg (1,000 mg Oral $Given 9/14/24 1255)   labetalol (NORMODYNE/TRANDATE) injection 20 mg (20 mg Intravenous $Given 9/14/24 1307)   ondansetron (ZOFRAN ODT) ODT tab 4 mg (4 mg Oral $Given 9/14/24 1309)   iopamidol (ISOVUE-370) solution 90 mL (90 mLs Intravenous $Given 9/14/24 1342)   sodium chloride (PF) 0.9% PF flush 80 mL (80 mLs Intravenous $Given 9/14/24 1344)   metoclopramide (REGLAN) injection 10 mg (10 mg Intravenous $Given 9/14/24 1647)   diphenhydrAMINE (BENADRYL) injection 25 mg (25 mg Intravenous $Given 9/14/24 1644)   ketorolac (TORADOL) injection 15 mg (15 mg Intravenous $Given 9/14/24 1646)   acetaminophen (TYLENOL) tablet 1,000 mg (1,000 mg Oral $Given 9/14/24 1644)       Procedures   Procedures     Discussion of Management   None    ED Course   ED Course as of 09/14/24 1756   Sat Sep 14, 2024   1236 I obtained history and examined the patient as noted above.        Additional Documentation  Social determinants affecting patient's health include: Medication noncompliance increasing risk for presentation to the emergency department     Medical Decision Making / Diagnosis     CMS Diagnoses: None    MIPS       None    MDM        64 year old female presenting w/ back pain, calf pain, hypertension    Differential diagnosis includes musculoskeletal back pain, calf strain, cellulitis although less likely given duration of symptoms, dissection, poorly controlled hypertension, medication noncompliance.  CT imaging did not show evidence of aortic pathology as described above.  Labs are reassuring.  The patient was given medications as noted above with improvement in her headache as well as her blood pressure.  Given she was symptomatic on her  previous antihypertensives, lisinopril was prescribed as noted below.  Pain is likely secondary to a strain.  CAM Walker boot was placed in the emergency department.  Physical therapy referral placed as well.  Patient reports that she has follow-up with her primary care provider this coming week for reevaluation.  She is advised to monitor her blood pressure at home, record the values and take them to follow-up.  At this time I feel the pt is safe for discharge.  Recommendations given regarding follow up with PCP and return to the emergency department as needed for new or worsening symptoms.  Pt counseled on all results, disposition and diagnosis.  They are understanding and agreeable to plan. Patient discharged in stable condition.      Disposition   The patient was discharged.     Diagnosis     ICD-10-CM    1. Hypertension, unspecified type  I10       2. Acute nonintractable headache, unspecified headache type  R51.9       3. Musculoskeletal back pain  M54.9 Physical Therapy  Referral      4. Pain of left calf  M79.662 Physical Therapy  Referral      5. Strain of calf muscle, left, initial encounter  S86.812A            Discharge Medications   Discharge Medication List as of 9/14/2024  5:16 PM        START taking these medications    Details   lisinopril (ZESTRIL) 10 MG tablet Take 1 tablet (10 mg) by mouth daily., Disp-30 tablet, R-0, E-Prescribe           Scribe Disclosure:  I, Sravani Ordaz, am serving as a scribe at 12:23 PM on 9/14/2024 to document services personally performed by Minor Hernandez MD based on my observations and the provider's statements to me.        Minor Hernandez MD  09/14/24 [id number]

## 2024-09-15 RX ORDER — LISINOPRIL 10 MG/1
10 TABLET ORAL DAILY
Qty: 30 TABLET | Refills: 0 | Status: SHIPPED | OUTPATIENT
Start: 2024-09-15

## 2024-09-16 LAB
ATRIAL RATE - MUSE: 66 BPM
DIASTOLIC BLOOD PRESSURE - MUSE: NORMAL MMHG
INTERPRETATION ECG - MUSE: NORMAL
P AXIS - MUSE: 62 DEGREES
PR INTERVAL - MUSE: 204 MS
QRS DURATION - MUSE: 78 MS
QT - MUSE: 386 MS
QTC - MUSE: 404 MS
R AXIS - MUSE: -5 DEGREES
SYSTOLIC BLOOD PRESSURE - MUSE: NORMAL MMHG
T AXIS - MUSE: 36 DEGREES
VENTRICULAR RATE- MUSE: 66 BPM

## 2024-09-23 ENCOUNTER — DOCUMENTATION ONLY (OUTPATIENT)
Dept: EMERGENCY MEDICINE | Facility: CLINIC | Age: 64
End: 2024-09-23
Payer: COMMERCIAL

## 2024-09-23 DIAGNOSIS — S86.812A STRAIN OF OTHER MUSCLE(S) AND TENDON(S) AT LOWER LEG LEVEL, LEFT LEG, INITIAL ENCOUNTER: Primary | ICD-10-CM

## 2024-10-13 ENCOUNTER — HEALTH MAINTENANCE LETTER (OUTPATIENT)
Age: 64
End: 2024-10-13

## 2025-06-14 ENCOUNTER — HEALTH MAINTENANCE LETTER (OUTPATIENT)
Age: 65
End: 2025-06-14